# Patient Record
Sex: FEMALE
[De-identification: names, ages, dates, MRNs, and addresses within clinical notes are randomized per-mention and may not be internally consistent; named-entity substitution may affect disease eponyms.]

---

## 2019-10-19 ENCOUNTER — HOSPITAL ENCOUNTER (EMERGENCY)
Dept: HOSPITAL 5 - ED | Age: 29
LOS: 1 days | Discharge: PSYCHIATRIC OR SUBSTANCE USE FACILITY | End: 2019-10-20
Payer: MEDICAID

## 2019-10-19 DIAGNOSIS — R46.1: Primary | ICD-10-CM

## 2019-10-19 DIAGNOSIS — F17.200: ICD-10-CM

## 2019-10-19 LAB
ALBUMIN SERPL-MCNC: 4.2 G/DL (ref 3.9–5)
ALT SERPL-CCNC: 10 UNITS/L (ref 7–56)
BENZODIAZEPINES SCREEN,URINE: (no result)
BILIRUB UR QL STRIP: (no result)
BLOOD UR QL VISUAL: (no result)
BUN SERPL-MCNC: 4 MG/DL (ref 7–17)
BUN/CREAT SERPL: 8 %
CALCIUM SERPL-MCNC: 8.8 MG/DL (ref 8.4–10.2)
HCT VFR BLD CALC: 41.1 % (ref 30.3–42.9)
HEMOLYSIS INDEX: 5
HGB BLD-MCNC: 14.1 GM/DL (ref 10.1–14.3)
MCHC RBC AUTO-ENTMCNC: 34 % (ref 30–34)
MCV RBC AUTO: 92 FL (ref 79–97)
METHADONE SCREEN,URINE: (no result)
MUCOUS THREADS #/AREA URNS HPF: (no result) /HPF
OPIATE SCREEN,URINE: (no result)
PH UR STRIP: 6 [PH] (ref 5–7)
PLATELET # BLD: 224 K/MM3 (ref 140–440)
PROT UR STRIP-MCNC: (no result) MG/DL
RBC # BLD AUTO: 4.48 M/MM3 (ref 3.65–5.03)
RBC #/AREA URNS HPF: 2 /HPF (ref 0–6)
UROBILINOGEN UR-MCNC: < 2 MG/DL (ref ?–2)
WBC #/AREA URNS HPF: 5 /HPF (ref 0–6)

## 2019-10-19 PROCEDURE — 81025 URINE PREGNANCY TEST: CPT

## 2019-10-19 PROCEDURE — 81001 URINALYSIS AUTO W/SCOPE: CPT

## 2019-10-19 PROCEDURE — 80185 ASSAY OF PHENYTOIN TOTAL: CPT

## 2019-10-19 PROCEDURE — 80164 ASSAY DIPROPYLACETIC ACD TOT: CPT

## 2019-10-19 PROCEDURE — 80178 ASSAY OF LITHIUM: CPT

## 2019-10-19 PROCEDURE — 80307 DRUG TEST PRSMV CHEM ANLYZR: CPT

## 2019-10-19 PROCEDURE — 80320 DRUG SCREEN QUANTALCOHOLS: CPT

## 2019-10-19 PROCEDURE — 80053 COMPREHEN METABOLIC PANEL: CPT

## 2019-10-19 PROCEDURE — 85027 COMPLETE CBC AUTOMATED: CPT

## 2019-10-19 PROCEDURE — 36415 COLL VENOUS BLD VENIPUNCTURE: CPT

## 2019-10-19 PROCEDURE — 70450 CT HEAD/BRAIN W/O DYE: CPT

## 2019-10-19 PROCEDURE — G0480 DRUG TEST DEF 1-7 CLASSES: HCPCS

## 2019-10-19 PROCEDURE — 82550 ASSAY OF CK (CPK): CPT

## 2019-10-19 NOTE — EMERGENCY DEPARTMENT REPORT
<ELIZA MOTLEY - Last Filed: 10/20/19 14:36>





ED General Adult HPI





- General


Chief complaint: Psych


Stated complaint: MH


Time Seen by Provider: 10/19/19 12:31


Source: patient, RN notes reviewed, old records reviewed


Mode of arrival: Ambulatory


Limitations: Altered Mental Status, Other (the patient is disorganized and a 

poor historian)





- History of Present Illness


Initial comments: 





This is a 29-year-old female.  The patient is not known to this provider 

previously.  The patient is reportedly brought to the hospital today by 

emergency medical services.  Apparently she was wandering and inappropriate.  

The patient is initially nonverbal.  She will nod yes no to certain questions.  

She scratches her legs quite a bit.  She denies physical pain at this time.  At 

some point during her history and physical, she began to speak to me.  She was 

able to tell me her name.  She was able to tell her that she is at a hospital.  

She does not know the month, she does not know the date.  She states that she is

not homicidal or suicidal.  She states no intention to overdose.  She denies 

hallucinations.  She cannot tell me where she lives.  She cannot tell me what 

medications she takes.  She cannot tell me how she will get home.


-: unknown


Quality: other


Consistency: other


Improves with: other


Worsens with: other





- Related Data


                                    Allergies











Allergy/AdvReac Type Severity Reaction Status Date / Time


 


Unable to Assess Allergy   Unverified 10/19/19 12:55














ED Review of Systems


Constitutional: denies: fever


Respiratory: denies: wheezing


Cardiovascular: denies: syncope


Gastrointestinal: denies: abdominal pain


Genitourinary: denies: dysuria


Musculoskeletal: denies: back pain


Psychiatric: denies: homicidal thoughts, suicidal thoughts





ED Past Medical Hx





- Social History


Smoking Status: Current Every Day Smoker


Substance Use Type: None





ED Physical Exam





- General


Limitations: Other (the patient is disorganized.  The patient is a poor 

historian.)


General appearance: alert, in no apparent distress





- Head


Head exam: Present: atraumatic, normocephalic





- Eye


Eye exam: Present: normal appearance, EOMI.  Absent: nystagmus





- ENT


ENT exam: Present: normal exam, normal orophraynx, mucous membranes moist, 

normal external ear exam





- Neck


Neck exam: Present: normal inspection, full ROM.  Absent: tenderness, 

meningismus





- Respiratory


Respiratory exam: Present: normal lung sounds bilaterally.  Absent: respiratory 

distress





- Cardiovascular


Cardiovascular Exam: Present: normal rhythm, bradycardia, normal heart sounds.  

Absent: systolic murmur, diastolic murmur, rubs, gallop





- GI/Abdominal


GI/Abdominal exam: Present: soft.  Absent: distended, tenderness, rigid, 

pulsatile mass





- Extremities Exam


Extremities exam: Present: normal inspection, full ROM, other (2+ pulses noted 

in the bilateral upper, lower extremities.  There is no long bone tenderness.  

Musculoskeletal compartments are soft.  The pelvis is stable.).  Absent: pedal 

edema, calf tenderness





- Back Exam


Back exam: Present: normal inspection, full ROM.  Absent: tenderness, CVA 

tenderness (R), CVA tenderness (L), paraspinal tenderness, vertebral tenderness





- Neurological Exam


Neurological exam: Present: alert, other (there is no facial droop.  The tongue 

is midline.  Extraocular movements are intact bilaterally.  Patient speaking in 

full complete sentences.  Shoulder shrug is intact bilaterally.  Hearing is 

grossly intact bilaterally.  Visual acuity intact to finger counting and color 

perception at a close distance.  5/5 strength 4 extremities.  Sensation intact 

to light touch in 4 extremities.)





- Psychiatric


Psychiatric exam: Present: flat affect.  Absent: homicidal ideation, suicidal 

ideation





- Skin


Skin exam: Present: warm, dry, intact, normal color.  Absent: rash





ED Course





- Reevaluation(s)


Reevaluation #1: 





10/19/19 14:06


Differential diagnosis, including but not limited to: Disorganized behavior, 

mood disorder, medical clearance





Assessment and plan: 29-year-old female who is a poor historian, moving 4 

extremities, does not appear to be in any acute distress, has an unremarkable 

physical exam, she is not violent, she is not combative, she is not endorsed 

homicidality, suicidality, or intention to overdose.  Screening laboratory 

studies are unremarkable.  We will obtain psychiatry consult.  She is 

disorganized, however, if her group home Recontacted in her baseline status is 

is unchanged from prior, it would be reasonable to send the patient back to her 

group home, if they can offer corroborating collateral information.  However, if

her group home cannot be contacted, or if this is a change in her functional 

status, I would consider it reasonable to place the patient on a 1013, for 

disorganized behavior and inability to care for self.  We will await orville garcia's recommendations.  In addition, a  consult has been 

requested to facilitate safe and proper disposition.


Reevaluation #2: 





10/19/19 14:30


Laboratory studies are reviewed and appreciated.  They're unremarkable.  The 

patient at this point time does not appear to have an emergent medical 

condition.  Patient is medically suitable for discharge home, with a safe social

situation can be corroborated and establish, otherwise, she is medically 

suitable for placement into a psychiatric facility, if the psychiatry team deems

it necessary.


Reevaluation #3: 





10/20/19 13:27


A noncontrast CT scan of the brain was requested by the psychiatry team, was 

found to be negative for acute disease.





A 1013 was executed  by the psychiatry team.





ED Medical Decision Making





- Lab Data


Result diagrams: 


                                 10/19/19 12:54





                                 10/19/19 12:54








                                   Vital Signs











  10/19/19 10/19/19





  12:16 12:38


 


Temperature 97.7 F 97.7 F


 


Pulse Rate 56 L 56 L


 


Respiratory 18 18





Rate  


 


Blood Pressure 116/75 


 


Blood Pressure  116/75





[Right]  


 


O2 Sat by Pulse 99 99





Oximetry  











                                   Lab Results











  10/19/19 10/19/19 10/19/19 Range/Units





  12:38 12:54 12:54 


 


WBC   5.8   (4.5-11.0)  K/mm3


 


RBC   4.48   (3.65-5.03)  M/mm3


 


Hgb   14.1   (10.1-14.3)  gm/dl


 


Hct   41.1   (30.3-42.9)  %


 


MCV   92   (79-97)  fl


 


MCH   32   (28-32)  pg


 


MCHC   34   (30-34)  %


 


RDW   13.5   (13.2-15.2)  %


 


Plt Count   224   (140-440)  K/mm3


 


Sodium    140  (137-145)  mmol/L


 


Potassium    4.0  (3.6-5.0)  mmol/L


 


Chloride    105.3  ()  mmol/L


 


Carbon Dioxide    23  (22-30)  mmol/L


 


Anion Gap    16  mmol/L


 


BUN    4 L  (7-17)  mg/dL


 


Creatinine    0.5 L  (0.7-1.2)  mg/dL


 


Estimated GFR    > 60  ml/min


 


BUN/Creatinine Ratio    8  %


 


Glucose    104 H  ()  mg/dL


 


Calcium    8.8  (8.4-10.2)  mg/dL


 


Total Bilirubin    0.50  (0.1-1.2)  mg/dL


 


AST    13  (5-40)  units/L


 


ALT    10  (7-56)  units/L


 


Alkaline Phosphatase    75  ()  units/L


 


Total Creatine Kinase    65  ()  units/L


 


Total Protein    7.3  (6.3-8.2)  g/dL


 


Albumin    4.2  (3.9-5)  g/dL


 


Albumin/Globulin Ratio    1.4  %


 


Urine Color     (Yellow)  


 


Urine Turbidity     (Clear)  


 


Urine pH     (5.0-7.0)  


 


Ur Specific Gravity     (1.003-1.030)  


 


Urine Protein     (Negative)  mg/dL


 


Urine Glucose (UA)     (Negative)  mg/dL


 


Urine Ketones     (Negative)  mg/dL


 


Urine Blood     (Negative)  


 


Urine Nitrite     (Negative)  


 


Urine Bilirubin     (Negative)  


 


Urine Urobilinogen     (<2.0)  mg/dL


 


Ur Leukocyte Esterase     (Negative)  


 


Urine WBC (Auto)     (0.0-6.0)  /HPF


 


Urine RBC (Auto)     (0.0-6.0)  /HPF


 


U Epithel Cells (Auto)     (0-13.0)  /HPF


 


Urine Mucus     /HPF


 


Salicylates     (2.8-20.0)  mg/dL


 


Urine Opiates Screen  Presumptive negative    


 


Urine Methadone Screen  Presumptive negative    


 


Acetaminophen     (10.0-30.0)  ug/mL


 


Ur Barbiturates Screen  Presumptive negative    


 


Phenytoin     (10.0-20.0)  ug/mL


 


Valproic Acid     ()  ug/mL


 


Ur Phencyclidine Scrn  Presumptive negative    


 


Ur Amphetamines Screen  Presumptive negative    


 


U Benzodiazepines Scrn  Presumptive negative    


 


Lithium     (0.0-1.2)  mmol/L


 


Urine Cocaine Screen  Presumptive negative    


 


U Marijuana (THC) Screen  Presumptive negative    


 


Drugs of Abuse Note  Disclamer    


 


Plasma/Serum Alcohol     (0-0.07)  %














  10/19/19 10/19/19 10/19/19 Range/Units





  12:54 12:54 12:54 


 


WBC     (4.5-11.0)  K/mm3


 


RBC     (3.65-5.03)  M/mm3


 


Hgb     (10.1-14.3)  gm/dl


 


Hct     (30.3-42.9)  %


 


MCV     (79-97)  fl


 


MCH     (28-32)  pg


 


MCHC     (30-34)  %


 


RDW     (13.2-15.2)  %


 


Plt Count     (140-440)  K/mm3


 


Sodium     (137-145)  mmol/L


 


Potassium     (3.6-5.0)  mmol/L


 


Chloride     ()  mmol/L


 


Carbon Dioxide     (22-30)  mmol/L


 


Anion Gap     mmol/L


 


BUN     (7-17)  mg/dL


 


Creatinine     (0.7-1.2)  mg/dL


 


Estimated GFR     ml/min


 


BUN/Creatinine Ratio     %


 


Glucose     ()  mg/dL


 


Calcium     (8.4-10.2)  mg/dL


 


Total Bilirubin     (0.1-1.2)  mg/dL


 


AST     (5-40)  units/L


 


ALT     (7-56)  units/L


 


Alkaline Phosphatase     ()  units/L


 


Total Creatine Kinase     ()  units/L


 


Total Protein     (6.3-8.2)  g/dL


 


Albumin     (3.9-5)  g/dL


 


Albumin/Globulin Ratio     %


 


Urine Color     (Yellow)  


 


Urine Turbidity     (Clear)  


 


Urine pH     (5.0-7.0)  


 


Ur Specific Gravity     (1.003-1.030)  


 


Urine Protein     (Negative)  mg/dL


 


Urine Glucose (UA)     (Negative)  mg/dL


 


Urine Ketones     (Negative)  mg/dL


 


Urine Blood     (Negative)  


 


Urine Nitrite     (Negative)  


 


Urine Bilirubin     (Negative)  


 


Urine Urobilinogen     (<2.0)  mg/dL


 


Ur Leukocyte Esterase     (Negative)  


 


Urine WBC (Auto)     (0.0-6.0)  /HPF


 


Urine RBC (Auto)     (0.0-6.0)  /HPF


 


U Epithel Cells (Auto)     (0-13.0)  /HPF


 


Urine Mucus     /HPF


 


Salicylates  < 0.3 L    (2.8-20.0)  mg/dL


 


Urine Opiates Screen     


 


Urine Methadone Screen     


 


Acetaminophen   < 5.0 L   (10.0-30.0)  ug/mL


 


Ur Barbiturates Screen     


 


Phenytoin  1.2 L    (10.0-20.0)  ug/mL


 


Valproic Acid  < 2.8 L    ()  ug/mL


 


Ur Phencyclidine Scrn     


 


Ur Amphetamines Screen     


 


U Benzodiazepines Scrn     


 


Lithium  0.1    (0.0-1.2)  mmol/L


 


Urine Cocaine Screen     


 


U Marijuana (THC) Screen     


 


Drugs of Abuse Note     


 


Plasma/Serum Alcohol    < 0.01  (0-0.07)  %














  10/19/19 Range/Units





  Unknown 


 


WBC   (4.5-11.0)  K/mm3


 


RBC   (3.65-5.03)  M/mm3


 


Hgb   (10.1-14.3)  gm/dl


 


Hct   (30.3-42.9)  %


 


MCV   (79-97)  fl


 


MCH   (28-32)  pg


 


MCHC   (30-34)  %


 


RDW   (13.2-15.2)  %


 


Plt Count   (140-440)  K/mm3


 


Sodium   (137-145)  mmol/L


 


Potassium   (3.6-5.0)  mmol/L


 


Chloride   ()  mmol/L


 


Carbon Dioxide   (22-30)  mmol/L


 


Anion Gap   mmol/L


 


BUN   (7-17)  mg/dL


 


Creatinine   (0.7-1.2)  mg/dL


 


Estimated GFR   ml/min


 


BUN/Creatinine Ratio   %


 


Glucose   ()  mg/dL


 


Calcium   (8.4-10.2)  mg/dL


 


Total Bilirubin   (0.1-1.2)  mg/dL


 


AST   (5-40)  units/L


 


ALT   (7-56)  units/L


 


Alkaline Phosphatase   ()  units/L


 


Total Creatine Kinase   ()  units/L


 


Total Protein   (6.3-8.2)  g/dL


 


Albumin   (3.9-5)  g/dL


 


Albumin/Globulin Ratio   %


 


Urine Color  Yellow  (Yellow)  


 


Urine Turbidity  Hazy  (Clear)  


 


Urine pH  6.0  (5.0-7.0)  


 


Ur Specific Gravity  1.012  (1.003-1.030)  


 


Urine Protein  <15 mg/dl  (Negative)  mg/dL


 


Urine Glucose (UA)  Neg  (Negative)  mg/dL


 


Urine Ketones  Neg  (Negative)  mg/dL


 


Urine Blood  Neg  (Negative)  


 


Urine Nitrite  Neg  (Negative)  


 


Urine Bilirubin  Neg  (Negative)  


 


Urine Urobilinogen  < 2.0  (<2.0)  mg/dL


 


Ur Leukocyte Esterase  Lg  (Negative)  


 


Urine WBC (Auto)  5.0  (0.0-6.0)  /HPF


 


Urine RBC (Auto)  2.0  (0.0-6.0)  /HPF


 


U Epithel Cells (Auto)  1.0  (0-13.0)  /HPF


 


Urine Mucus  Few  /HPF


 


Salicylates   (2.8-20.0)  mg/dL


 


Urine Opiates Screen   


 


Urine Methadone Screen   


 


Acetaminophen   (10.0-30.0)  ug/mL


 


Ur Barbiturates Screen   


 


Phenytoin   (10.0-20.0)  ug/mL


 


Valproic Acid   ()  ug/mL


 


Ur Phencyclidine Scrn   


 


Ur Amphetamines Screen   


 


U Benzodiazepines Scrn   


 


Lithium   (0.0-1.2)  mmol/L


 


Urine Cocaine Screen   


 


U Marijuana (THC) Screen   


 


Drugs of Abuse Note   


 


Plasma/Serum Alcohol   (0-0.07)  %














ED Disposition


Clinical Impression: 


 Disorganized behavior, Acute psychosis





Disposition: DC/TX-65 PSY HOSP/PSY UNIT


Is pt being admited?: No


Does the pt Need Aspirin: No


Condition: Good


Referrals: 


PRIMARY CARE,MD [Primary Care Provider] - 3-5 Days





<DEBBIE MCCARTNEY - Last Filed: 10/20/19 18:54>





ED Review of Systems


ROS: 


Stated complaint: MH


Other details as noted in HPI








ED Course





                                   Vital Signs











  10/19/19 10/19/19 10/19/19





  12:16 12:38 13:00


 


Temperature 97.7 F 97.7 F 98.0 F


 


Pulse Rate 56 L 56 L 54 L


 


Respiratory 18 18 20





Rate   


 


Blood Pressure 116/75  


 


Blood Pressure  116/75 113/94





[Right]   


 


O2 Sat by Pulse 99 99 95





Oximetry   














  10/19/19 10/20/19 10/20/19





  21:13 01:00 07:00


 


Temperature 98.3 F 98.0 F 98.1 F


 


Pulse Rate 78 57 L 87


 


Respiratory 18 18 16





Rate   


 


Blood Pressure   


 


Blood Pressure 116/80 108/72 137/84





[Right]   


 


O2 Sat by Pulse 98 96 96





Oximetry   














  10/20/19





  13:00


 


Temperature 98.0 F


 


Pulse Rate 72


 


Respiratory 18





Rate 


 


Blood Pressure 


 


Blood Pressure 118/76





[Right] 


 


O2 Sat by Pulse 96





Oximetry 














- Reevaluation(s)


Reevaluation #4: 





10/20/19 18:53


Patient has been accepted to [facility]





ED Medical Decision Making





- Lab Data


Result diagrams: 


                                 10/19/19 12:54





                                 10/19/19 12:54


Critical care attestation.: 


If time is entered above; I have spent that time in minutes in the direct care 

of this critically ill patient, excluding procedure time.








ED Disposition


Is pt being admited?: No


Does the pt Need Aspirin: No


Time of Disposition: 18:54

## 2019-10-20 VITALS — DIASTOLIC BLOOD PRESSURE: 70 MMHG | SYSTOLIC BLOOD PRESSURE: 100 MMHG

## 2019-10-20 NOTE — CAT SCAN REPORT
Head CT without intravenous contrast 



INDICATION: Altered mental status



COMPARISON: None



FINDINGS: The ventricles are normal in size and position. No hemorrhage or extra-axial fluid collecti
on. No edema or mass effect. No focal infarct seen. Portions of the sinuses visualized are clear. No 
skull fracture identified.



IMPRESSION: Negative head CT



Automated exposure control was utilized to diminish radiation dose



Signer Name: Alexis Nelson MD 

Signed: 10/20/2019 10:25 AM

 Workstation Name: [id number]

## 2021-02-12 ENCOUNTER — HOSPITAL ENCOUNTER (EMERGENCY)
Dept: HOSPITAL 5 - ED | Age: 31
Discharge: HOME | End: 2021-02-12
Payer: MEDICAID

## 2021-02-12 VITALS — DIASTOLIC BLOOD PRESSURE: 75 MMHG | SYSTOLIC BLOOD PRESSURE: 122 MMHG

## 2021-02-12 DIAGNOSIS — F17.200: ICD-10-CM

## 2021-02-12 DIAGNOSIS — Z79.899: ICD-10-CM

## 2021-02-12 DIAGNOSIS — F31.9: ICD-10-CM

## 2021-02-12 DIAGNOSIS — Z88.8: ICD-10-CM

## 2021-02-12 DIAGNOSIS — L02.412: Primary | ICD-10-CM

## 2021-02-12 DIAGNOSIS — J45.909: ICD-10-CM

## 2021-02-12 DIAGNOSIS — Z20.828: ICD-10-CM

## 2021-02-12 PROCEDURE — 99283 EMERGENCY DEPT VISIT LOW MDM: CPT

## 2021-02-12 NOTE — EMERGENCY DEPARTMENT REPORT
- General


Chief complaint: Skin/Abscess/Foreign Body


Stated complaint: LT ARM POSSIBLE INFECTION


Time Seen by Provider: 02/12/21 14:57


Source: patient, EMS


Mode of arrival: Ambulatory


Limitations: No Limitations





- History of Present Illness


Initial comments: 





30-year-old female with a past medical history of bipolar disease, seizures, and

asthma presents to the hospital planing of left axilla abscess x4 days.  

Increasing in size.  No fever or drainage.  Patient thinks she has received a 

tetanus in the last 10 years.  Pain is moderate and worse to palpation





- Related Data


                                  Previous Rx's











 Medication  Instructions  Recorded  Last Taken  Type


 


Ibuprofen [Motrin] 800 mg PO Q8HR PRN #20 tablet 02/12/21 Unknown Rx


 


Sulfamethoxazole/Trimethoprim 1 each PO BID #20 tablet 02/12/21 Unknown Rx





[Bactrim DS TAB]    











                                    Allergies











Allergy/AdvReac Type Severity Reaction Status Date / Time


 


butorphanol [From Stadol] Allergy  Unknown Verified 02/12/21 12:00


 


chlorpromazine Allergy  Unknown Verified 02/12/21 12:00





[From Thorazine]     














Abscess Boil HPI





- HPI


Chief Complaint: Skin/Abscess/Foreign Body


Stated Complaint: LT ARM POSSIBLE INFECTION


Time Seen by Provider: 02/12/21 14:57


Home Medications: 


                                  Previous Rx's











 Medication  Instructions  Recorded  Last Taken  Type


 


Ibuprofen [Motrin] 800 mg PO Q8HR PRN #20 tablet 02/12/21 Unknown Rx


 


Sulfamethoxazole/Trimethoprim 1 each PO BID #20 tablet 02/12/21 Unknown Rx





[Bactrim DS TAB]    











Allergies/Adverse Reactions: 


                                    Allergies











Allergy/AdvReac Type Severity Reaction Status Date / Time


 


butorphanol [From Stadol] Allergy  Unknown Verified 02/12/21 12:00


 


chlorpromazine Allergy  Unknown Verified 02/12/21 12:00





[From Thorazine]     














ED Review of Systems


ROS: 


Stated complaint: LT ARM POSSIBLE INFECTION


Other details as noted in HPI





Comment: All other systems reviewed and negative





ED Past Medical Hx





- Past Medical History


Hx Seizures: Yes


Hx Psychiatric Treatment: Yes (BIPOLAR)


Hx Asthma: Yes


Additional medical history: HUNTING DIESASE





- Social History


Smoking Status: Current Every Day Smoker


Substance Use Type: None





- Medications


Home Medications: 


                                Home Medications











 Medication  Instructions  Recorded  Confirmed  Last Taken  Type


 


Ibuprofen [Motrin] 800 mg PO Q8HR PRN #20 tablet 02/12/21  Unknown Rx


 


Sulfamethoxazole/Trimethoprim 1 each PO BID #20 tablet 02/12/21  Unknown Rx





[Bactrim DS TAB]     














ED Physical Exam





- General


Limitations: No Limitations





- Other


Other exam information: 





General: No acute distress


Head: Atraumatic


Eyes: normal appearance


ENT: Moist mucous membranes


Neck: Normal appearance, no midline tenderness


Chest: Clear to auscultation bilaterally


CV: Regular rate and rhythm


Abdomen: Soft, normal bowel sounds, nontender, nondistended, no rebound or 

guarding


Back: Normal inspection


Extremity: Normal inspection, full range of motion


Neuro: Alert O x 3, no facial asymmetry, speech clear, no gross motor sensory d

eficit.  No tremor


Psych: Appropriate behavior


Skin: 2 2 cm raised areas of induration and redness at the left axilla that 

feels indurated





ED Course


                                   Vital Signs











  02/12/21 02/12/21





  12:01 15:52


 


Temperature 98.1 F 


 


Pulse Rate 95 H 


 


Respiratory 16 20





Rate  


 


Blood Pressure 122/75 


 


O2 Sat by Pulse 100 





Oximetry  














ED Medical Decision Making





- Medical Decision Making





Patient patient has had early abscess with induration and erythema.  Will be 

prescribed Bactrim and warm compresses.  Patient may need I&D if area becomes 

more fluctuant patient did receive Norco and Bactrim in the ED. patient does not

mention that she is here for alcohol treatment or suicidal thoughts at time of 

my evaluation.  Patient is not exhibiting signs of alcohol withdrawal at time of

my evaluation.


Critical Care Time: No


Critical care attestation.: 


If time is entered above; I have spent that time in minutes in the direct care 

of this critically ill patient, excluding procedure time.








ED Disposition


Clinical Impression: 


 Abscess of left axilla, Cellulitis of left axilla





Disposition: DC-01 TO HOME OR SELFCARE


Is pt being admited?: No


Does the pt Need Aspirin: No


Condition: Stable


Instructions:  Skin Abscess, Cellulitis, Adult


Additional Instructions: 


Take the medication as prescribed.  Apply warm compresses several times a day.  

Follow-up with your doctor or doctor/clinic provided.  Return if symptoms worsen

as indicated by your discharge instructions.


Prescriptions: 


Sulfamethoxazole/Trimethoprim [Bactrim DS TAB] 1 each PO BID #20 tablet


Ibuprofen [Motrin] 800 mg PO Q8HR PRN #20 tablet


 PRN Reason: Pain , Severe (7-10)


Referrals: 


PRIMARY CARE,MD [Primary Care Provider] - 3-5 Days


[facility] [Provider Group] - 3-5 Days


Time of Disposition: 16:15